# Patient Record
Sex: FEMALE | Employment: PART TIME | URBAN - NONMETROPOLITAN AREA
[De-identification: names, ages, dates, MRNs, and addresses within clinical notes are randomized per-mention and may not be internally consistent; named-entity substitution may affect disease eponyms.]

---

## 2019-05-08 ENCOUNTER — OFFICE VISIT (OUTPATIENT)
Dept: URGENT CARE | Facility: PHYSICIAN GROUP | Age: 29
End: 2019-05-08
Payer: MEDICAID

## 2019-05-08 ENCOUNTER — HOSPITAL ENCOUNTER (OUTPATIENT)
Facility: MEDICAL CENTER | Age: 29
End: 2019-05-08
Attending: PHYSICIAN ASSISTANT
Payer: MEDICAID

## 2019-05-08 VITALS
OXYGEN SATURATION: 92 % | TEMPERATURE: 98 F | HEART RATE: 82 BPM | SYSTOLIC BLOOD PRESSURE: 122 MMHG | WEIGHT: 203 LBS | DIASTOLIC BLOOD PRESSURE: 76 MMHG | RESPIRATION RATE: 16 BRPM | BODY MASS INDEX: 34.66 KG/M2 | HEIGHT: 64 IN

## 2019-05-08 DIAGNOSIS — R10.30 LOWER ABDOMINAL PAIN: ICD-10-CM

## 2019-05-08 DIAGNOSIS — N30.00 ACUTE CYSTITIS WITHOUT HEMATURIA: ICD-10-CM

## 2019-05-08 DIAGNOSIS — K29.70 GASTRITIS WITHOUT BLEEDING, UNSPECIFIED CHRONICITY, UNSPECIFIED GASTRITIS TYPE: ICD-10-CM

## 2019-05-08 DIAGNOSIS — E66.9 OBESITY (BMI 30-39.9): ICD-10-CM

## 2019-05-08 LAB
APPEARANCE UR: NORMAL
BILIRUB UR STRIP-MCNC: NORMAL MG/DL
COLOR UR AUTO: NORMAL
GLUCOSE UR STRIP.AUTO-MCNC: NORMAL MG/DL
KETONES UR STRIP.AUTO-MCNC: NORMAL MG/DL
LEUKOCYTE ESTERASE UR QL STRIP.AUTO: NORMAL
NITRITE UR QL STRIP.AUTO: NORMAL
PH UR STRIP.AUTO: 6 [PH] (ref 5–8)
PROT UR QL STRIP: 30 MG/DL
RBC UR QL AUTO: NORMAL
SP GR UR STRIP.AUTO: 1.03
UROBILINOGEN UR STRIP-MCNC: 2 MG/DL

## 2019-05-08 PROCEDURE — 81002 URINALYSIS NONAUTO W/O SCOPE: CPT | Performed by: PHYSICIAN ASSISTANT

## 2019-05-08 PROCEDURE — 99214 OFFICE O/P EST MOD 30 MIN: CPT | Mod: 25 | Performed by: PHYSICIAN ASSISTANT

## 2019-05-08 PROCEDURE — 87086 URINE CULTURE/COLONY COUNT: CPT

## 2019-05-08 RX ORDER — NITROFURANTOIN 25; 75 MG/1; MG/1
100 CAPSULE ORAL EVERY 12 HOURS
Qty: 10 CAP | Refills: 0 | Status: SHIPPED | OUTPATIENT
Start: 2019-05-08 | End: 2019-05-13

## 2019-05-08 RX ORDER — OMEPRAZOLE 20 MG/1
20 CAPSULE, DELAYED RELEASE ORAL DAILY
Qty: 30 CAP | Refills: 0 | Status: SHIPPED | OUTPATIENT
Start: 2019-05-08 | End: 2021-11-11

## 2019-05-08 ASSESSMENT — PATIENT HEALTH QUESTIONNAIRE - PHQ9: CLINICAL INTERPRETATION OF PHQ2 SCORE: 0

## 2019-05-09 DIAGNOSIS — N30.00 ACUTE CYSTITIS WITHOUT HEMATURIA: ICD-10-CM

## 2019-05-09 NOTE — PROGRESS NOTES
Chief Complaint   Patient presents with   • GI Problem       HISTORY OF PRESENT ILLNESS: Patient is a 29 y.o. female who presents today because she has some abdominal discomfort.  She has some discomfort in her lower abdomen, some discomfort in her upper abdomen.  Her symptoms have been going on between a few days and a week or so.  She denies any fevers, chills, nausea, vomiting or diarrhea.  She has had normal bowel movements twice in the last 2 days without any blood or mucus.  She has not been taking any medications for her symptoms.    Patient Active Problem List    Diagnosis Date Noted   • Obesity (BMI 30-39.9) 05/08/2019       Allergies:Patient has no known allergies.    Current Outpatient Prescriptions Ordered in [facility]   Medication Sig Dispense Refill   • nitrofurantoin monohyd macro (MACROBID) 100 MG Cap Take 1 Cap by mouth every 12 hours for 5 days. 10 Cap 0   • omeprazole (PRILOSEC) 20 MG delayed-release capsule Take 1 Cap by mouth every day. 30 Cap 0     No current Epic-ordered facility-administered medications on file.        History reviewed. No pertinent past medical history.    Social History   Substance Use Topics   • Smoking status: Never Smoker   • Smokeless tobacco: Never Used   • Alcohol use No       No family status information on file.   History reviewed. No pertinent family history.    ROS:  Review of Systems   Constitutional: Negative for fever, chills, weight loss and malaise/fatigue.   HENT: Negative for ear pain, nosebleeds, congestion, sore throat and neck pain.    Eyes: Negative for blurred vision.   Respiratory: Negative for cough, sputum production, shortness of breath and wheezing.    Cardiovascular: Negative for chest pain, palpitations, orthopnea and leg swelling.   Gastrointestinal: Negative for heartburn, positive mild nausea, no vomiting and positive for midepigastric and lower abdominal pain.   Genitourinary: Negative for dysuria, urgency and frequency.     Exam:  /76 (BP  "Location: Left arm, Patient Position: Sitting, BP Cuff Size: Large adult)   Pulse 82   Temp 36.7 °C (98 °F)   Resp 16   Ht 1.626 m (5' 4\")   Wt 92.1 kg (203 lb)   SpO2 92%   General:  Well nourished, well developed female in NAD  Head:Normocephalic, atraumatic  Eyes: PERRLA, EOM within normal limits, no conjunctival injection, no scleral icterus, visual fields and acuity grossly intact.  Nose: Symmetrical without tenderness, no discharge.  Mouth: reasonable hygiene, no erythema exudates or tonsillar enlargement.  Neck: no masses, range of motion within normal limits, no tracheal deviation. No obvious thyroid enlargement.  Pulmonary: chest is symmetrical with respiration, no wheezes, crackles, or rhonchi.  Cardiovascular: regular rate and rhythm without murmurs, rubs, or gallops.  Abdomen: Nondistended, bowel tones in all 4 quadrants, soft, she has midepigastric tenderness to palpation, as well as suprapubic tenderness to palpation, no organomegaly, no rebound or referred rebound tenderness, no Kohler's or McBurney's point tenderness.  Extremities: no clubbing, cyanosis, or edema.    Urinalysis in the office is leukocyte esterase, trace protein, trace ketones, urobilinogen    Please note that this dictation was created using voice recognition software. I have made every reasonable attempt to correct obvious errors, but I expect that there are errors of grammar and possibly content that I did not discover before finalizing the note.    Assessment/Plan:  1. Gastritis without bleeding, unspecified chronicity, unspecified gastritis type  omeprazole (PRILOSEC) 20 MG delayed-release capsule   2. Lower abdominal pain  POCT Urinalysis   3. Acute cystitis without hematuria  Urine Culture    nitrofurantoin monohyd macro (MACROBID) 100 MG Cap   4. Obesity (BMI 30-39.9)  Patient identified as having weight management issue.  Appropriate orders and counseling given.       Followup with primary care in the next 7-10 days if " not significantly improving, return to the urgent care or go to the emergency room sooner for any worsening of symptoms.

## 2019-05-11 LAB
BACTERIA UR CULT: NORMAL
SIGNIFICANT IND 70042: NORMAL
SITE SITE: NORMAL
SOURCE SOURCE: NORMAL

## 2019-06-28 ENCOUNTER — APPOINTMENT (OUTPATIENT)
Dept: URGENT CARE | Facility: PHYSICIAN GROUP | Age: 29
End: 2019-06-28
Payer: MEDICAID

## 2019-06-28 ENCOUNTER — OFFICE VISIT (OUTPATIENT)
Dept: URGENT CARE | Facility: PHYSICIAN GROUP | Age: 29
End: 2019-06-28
Payer: MEDICAID

## 2019-06-28 VITALS
HEART RATE: 68 BPM | HEIGHT: 64 IN | RESPIRATION RATE: 16 BRPM | SYSTOLIC BLOOD PRESSURE: 116 MMHG | WEIGHT: 192 LBS | BODY MASS INDEX: 32.78 KG/M2 | TEMPERATURE: 97.9 F | DIASTOLIC BLOOD PRESSURE: 80 MMHG | OXYGEN SATURATION: 100 %

## 2019-06-28 DIAGNOSIS — J02.9 PHARYNGITIS, UNSPECIFIED ETIOLOGY: ICD-10-CM

## 2019-06-28 DIAGNOSIS — H66.002 ACUTE SUPPURATIVE OTITIS MEDIA OF LEFT EAR WITHOUT SPONTANEOUS RUPTURE OF TYMPANIC MEMBRANE, RECURRENCE NOT SPECIFIED: ICD-10-CM

## 2019-06-28 DIAGNOSIS — R05.9 COUGH: ICD-10-CM

## 2019-06-28 PROCEDURE — 99214 OFFICE O/P EST MOD 30 MIN: CPT | Performed by: NURSE PRACTITIONER

## 2019-06-28 RX ORDER — BENZONATATE 100 MG/1
100 CAPSULE ORAL 3 TIMES DAILY PRN
Qty: 60 CAP | Refills: 0 | Status: SHIPPED | OUTPATIENT
Start: 2019-06-28 | End: 2021-10-28

## 2019-06-28 RX ORDER — AMOXICILLIN AND CLAVULANATE POTASSIUM 875; 125 MG/1; MG/1
1 TABLET, FILM COATED ORAL 2 TIMES DAILY
Qty: 14 TAB | Refills: 0 | Status: SHIPPED | OUTPATIENT
Start: 2019-06-28 | End: 2019-07-05

## 2019-06-28 ASSESSMENT — ENCOUNTER SYMPTOMS
SORE THROAT: 1
MYALGIAS: 0
HEADACHES: 0
COUGH: 1
SWOLLEN GLANDS: 0
FEVER: 0
RHINORRHEA: 1
NAUSEA: 0
DIARRHEA: 0
EYE PAIN: 0
DIZZINESS: 0
CHILLS: 1
VOMITING: 0
SHORTNESS OF BREATH: 0

## 2019-06-28 NOTE — PROGRESS NOTES
"Subjective:   Janae Armstrong is a 29 y.o. female who presents for Pharyngitis        URI    This is a new problem. Episode onset: 3 days. The problem has been gradually worsening. There has been no fever. Associated symptoms include coughing, ear pain (left), rhinorrhea and a sore throat. Pertinent negatives include no chest pain, diarrhea, headaches, nausea, plugged ear sensation, rash, swollen glands or vomiting. She has tried acetaminophen for the symptoms. The treatment provided no relief.     Review of Systems   Constitutional: Positive for chills and malaise/fatigue. Negative for fever.   HENT: Positive for ear pain (left), rhinorrhea and sore throat.    Eyes: Negative for pain.   Respiratory: Positive for cough. Negative for shortness of breath.    Cardiovascular: Negative for chest pain.   Gastrointestinal: Negative for diarrhea, nausea and vomiting.   Genitourinary: Negative for hematuria.   Musculoskeletal: Negative for myalgias.   Skin: Negative for rash.   Neurological: Negative for dizziness and headaches.     No Known Allergies   Objective:   /80 (BP Location: Left arm, Patient Position: Sitting, BP Cuff Size: Adult)   Pulse 68   Temp 36.6 °C (97.9 °F) (Temporal)   Resp 16   Ht 1.626 m (5' 4\")   Wt 87.1 kg (192 lb)   SpO2 100%   BMI 32.96 kg/m²   Physical Exam   Constitutional: She is oriented to person, place, and time. She appears well-developed and well-nourished. No distress.   HENT:   Head: Normocephalic and atraumatic.   Right Ear: Tympanic membrane, external ear and ear canal normal.   Left Ear: External ear and ear canal normal. Tympanic membrane is erythematous and bulging. A middle ear effusion is present.   Nose: Nose normal. Right sinus exhibits no maxillary sinus tenderness and no frontal sinus tenderness. Left sinus exhibits no maxillary sinus tenderness and no frontal sinus tenderness.   Mouth/Throat: Uvula is midline, oropharynx is clear and moist and mucous " membranes are normal. No posterior oropharyngeal edema, posterior oropharyngeal erythema or tonsillar abscesses. No tonsillar exudate.   Eyes: Pupils are equal, round, and reactive to light. Conjunctivae and EOM are normal. Right eye exhibits no discharge. Left eye exhibits no discharge.   Cardiovascular: Normal rate and regular rhythm.    No murmur heard.  Pulmonary/Chest: Effort normal and breath sounds normal. No respiratory distress.   Abdominal: Soft. She exhibits no distension. There is no tenderness.   Neurological: She is alert and oriented to person, place, and time. She has normal reflexes. No sensory deficit.   Skin: Skin is warm, dry and intact.   Psychiatric: She has a normal mood and affect.         Assessment/Plan:     1. Acute suppurative otitis media of left ear without spontaneous rupture of tympanic membrane, recurrence not specified  amoxicillin-clavulanate (AUGMENTIN) 875-125 MG Tab   2. Pharyngitis, unspecified etiology     3. Cough  benzonatate (TESSALON) 100 MG Cap     Advised to continue supportive care with Tylenol and/or ibuprofen for fevers and discomfort. Increased fluids and electrolytes.  Patient given precautionary s/sx that mandate immediate follow up and evaluation in the ED. Advised of risks of not doing so.    DDX, Supportive care, and indications for immediate follow-up discussed with patient.    Instructed to return to clinic or nearest emergency department if we are not available for any change in condition, further concerns, or worsening of symptoms.    The patient demonstrated a good understanding and agreed with the treatment plan.

## 2021-10-28 ENCOUNTER — OFFICE VISIT (OUTPATIENT)
Dept: URGENT CARE | Facility: PHYSICIAN GROUP | Age: 31
End: 2021-10-28
Payer: MEDICAID

## 2021-10-28 VITALS
BODY MASS INDEX: 33.12 KG/M2 | TEMPERATURE: 98.3 F | RESPIRATION RATE: 16 BRPM | HEIGHT: 64 IN | WEIGHT: 194 LBS | SYSTOLIC BLOOD PRESSURE: 108 MMHG | OXYGEN SATURATION: 99 % | HEART RATE: 78 BPM | DIASTOLIC BLOOD PRESSURE: 68 MMHG

## 2021-10-28 DIAGNOSIS — H92.01 RIGHT EAR PAIN: ICD-10-CM

## 2021-10-28 DIAGNOSIS — S09.91XA INJURY OF EXTERNAL AUDITORY CANAL, INITIAL ENCOUNTER: ICD-10-CM

## 2021-10-28 DIAGNOSIS — S09.301A INJURY OF TYMPANIC MEMBRANE OF RIGHT EAR, INITIAL ENCOUNTER: ICD-10-CM

## 2021-10-28 PROCEDURE — 99214 OFFICE O/P EST MOD 30 MIN: CPT | Performed by: NURSE PRACTITIONER

## 2021-10-28 ASSESSMENT — ENCOUNTER SYMPTOMS
CHILLS: 0
FEVER: 0
HEADACHES: 0

## 2021-10-28 NOTE — PROGRESS NOTES
"Janae Armstrong is a 31 y.o. female who presents for Ear Injury ( Q-Tip right ear)      HPI this new problem.  Janae is a 31-year-old female presents with ear injury.  Last night she was cleaning ears with qtip. Pushing down and the qtip slipped. She then had fluid and blood in her ear with pain. Ear is sore, tender and achy. Increased pain when she was eating.  She looked on her pillowcase this morning there was no blood.  Her hearing is reported as normal.  Her right ear is a little more sensitive to sound at this point.  Treatments tried none.  No other aggravating or alleviating factors.    Review of Systems   Constitutional: Negative for chills and fever.   HENT: Positive for ear pain. Negative for congestion, hearing loss and tinnitus.    Neurological: Negative for headaches.       Allergies:     No Known Allergies    PMSFS Hx:  No past medical history on file.  No past surgical history on file.  No family history on file.  Social History     Tobacco Use   • Smoking status: Light Tobacco Smoker   • Smokeless tobacco: Never Used   Substance Use Topics   • Alcohol use: No       Problems:   Patient Active Problem List   Diagnosis   • Obesity (BMI 30-39.9)       Medications:   Current Outpatient Medications on File Prior to Visit   Medication Sig Dispense Refill   • benzonatate (TESSALON) 100 MG Cap Take 1 Cap by mouth 3 times a day as needed for Cough. (Patient not taking: Reported on 10/28/2021) 60 Cap 0   • omeprazole (PRILOSEC) 20 MG delayed-release capsule Take 1 Cap by mouth every day. (Patient not taking: Reported on 10/28/2021) 30 Cap 0     No current facility-administered medications on file prior to visit.          Objective:     /68   Pulse 78   Temp 36.8 °C (98.3 °F) (Temporal)   Resp 16   Ht 1.626 m (5' 4\")   Wt 88 kg (194 lb)   SpO2 99%   BMI 33.30 kg/m²     Physical Exam  Vitals and nursing note reviewed.   Constitutional:       Appearance: She is well-developed.   HENT:      " Head: Normocephalic.      Right Ear: Hearing normal. No decreased hearing noted. Tenderness present. No swelling. No middle ear effusion. There is impacted cerumen. Tympanic membrane is erythematous. Tympanic membrane is not retracted or bulging.      Left Ear: Hearing, tympanic membrane, ear canal and external ear normal. No decreased hearing noted.      Ears:        Comments: Unable to determine integrity of tympanic membrane on the right due to traumatic injury to the external canal with mild erythema as well as excessive cerumen that is occluding a third of the tympanic membrane.  She would not tolerate removal of cerumen today due to her trauma.  There is a small amount of blood in the external canal that seems isolated to the area of injury and inflammation.  TM appears to be intact     Mouth/Throat:      Mouth: Mucous membranes are moist.   Eyes:      Conjunctiva/sclera: Conjunctivae normal.   Cardiovascular:      Rate and Rhythm: Normal rate and regular rhythm.   Pulmonary:      Effort: Pulmonary effort is normal. No respiratory distress.      Breath sounds: Normal breath sounds.   Musculoskeletal:         General: Normal range of motion.      Cervical back: Normal range of motion and neck supple.   Lymphadenopathy:      Head:      Right side of head: No tonsillar adenopathy.      Left side of head: No tonsillar adenopathy.      Cervical: No cervical adenopathy.      Upper Body:      Right upper body: No supraclavicular adenopathy.      Left upper body: No supraclavicular adenopathy.   Skin:     General: Skin is warm and dry.   Neurological:      Mental Status: She is alert and oriented to person, place, and time.      Deep Tendon Reflexes: Reflexes are normal and symmetric.   Psychiatric:         Mood and Affect: Mood normal.         Speech: Speech normal.         Behavior: Behavior normal.         Thought Content: Thought content normal.         Assessment /Associated Orders:      1. Injury of tympanic  membrane of right ear, initial encounter  REFERRAL TO ENT   2. Right ear pain     3. Injury of external auditory canal, initial encounter             Medical Decision Making:    Pt is clinically stable at today's acute urgent care visit.  No acute distress noted. Appropriate for outpatient management at this time.   Acute problem today with uncertain prognosis.     Referral to ENT for follow-up and further evaluation of TM. She could also follow up with her PCP or urgent care.   Advised not to put anything into her ear  Stop using Q-tips  Over-the-counter analgesic of choice.  Warm compresses as needed pain      Advised to follow-up with the primary care provider for recheck, reevaluation, and consideration of further management if necessary.   Discussed management options (risks,benefits, and alternatives to treatment). Expressed understanding and the treatment plan was agreed upon. Questions were encouraged and answered   Return to urgent care prn if new or worsening sx or if there is no improvement in condition prn.    Educated in Red flags and indications to immediately call 911 or present to the Emergency Department.     I personally reviewed prior external notes and test results pertinent to today's visit.  I have independently reviewed and interpreted all diagnostics ordered during this urgent care acute visit.   Time spent evaluating this patient was at least 30 minutes and includes preparing for visit, counseling/education, exam and evaluation, obtaining history, independent interpretation, ordering lab/test/procedures,medication management and documentation.Time does not include separately billable procedures noted .

## 2021-11-11 ENCOUNTER — OFFICE VISIT (OUTPATIENT)
Dept: URGENT CARE | Facility: PHYSICIAN GROUP | Age: 31
End: 2021-11-11
Payer: MEDICAID

## 2021-11-11 VITALS
HEIGHT: 64 IN | OXYGEN SATURATION: 98 % | BODY MASS INDEX: 32.27 KG/M2 | TEMPERATURE: 99 F | DIASTOLIC BLOOD PRESSURE: 70 MMHG | HEART RATE: 69 BPM | SYSTOLIC BLOOD PRESSURE: 110 MMHG | WEIGHT: 189 LBS | RESPIRATION RATE: 16 BRPM

## 2021-11-11 DIAGNOSIS — L08.9 LOCAL SKIN INFECTION: ICD-10-CM

## 2021-11-11 PROCEDURE — 99214 OFFICE O/P EST MOD 30 MIN: CPT | Performed by: PHYSICIAN ASSISTANT

## 2021-11-11 RX ORDER — SULFAMETHOXAZOLE AND TRIMETHOPRIM 800; 160 MG/1; MG/1
1 TABLET ORAL 2 TIMES DAILY
Qty: 10 TABLET | Refills: 0 | Status: SHIPPED | OUTPATIENT
Start: 2021-11-11 | End: 2021-11-16

## 2021-11-11 ASSESSMENT — ENCOUNTER SYMPTOMS
FEVER: 0
CHILLS: 0

## 2021-11-11 NOTE — PROGRESS NOTES
"  Subjective:   Janae Armstrong is a 31 y.o. female who presents today with   Chief Complaint   Patient presents with   • Skin Lesion     LEFT LEG, BUMP POPPED AND TENDER       Other  This is a new problem. The current episode started in the past 7 days. The problem occurs constantly. The problem has been unchanged. Pertinent negatives include no chills or fever. Treatments tried: popping the lesion. The treatment provided mild relief.   Patient states she had a cut on her left anterior lower leg in January and had some sutures placed but she waited longer than 10 days to have them removed and when she had them removed there was still some that were intact internally.  Patient states a couple of days ago she had a bump just superior to the healed laceration site that looked like a pimple and she popped it and there was some pus that came out.  PMH:  has no past medical history on file.  MEDS:   Current Outpatient Medications:   •  sulfamethoxazole-trimethoprim (BACTRIM DS) 800-160 MG tablet, Take 1 Tablet by mouth 2 times a day for 5 days., Disp: 10 Tablet, Rfl: 0  ALLERGIES: No Known Allergies  SURGHX: No past surgical history on file.  SOCHX:  reports that she has been smoking. She has never used smokeless tobacco. She reports that she does not drink alcohol and does not use drugs.  FH: Reviewed with patient, not pertinent to this visit.     Review of Systems   Constitutional: Negative for chills and fever.   Skin:        Pimple/lesion to left lower extremity      Objective:   /70   Pulse 69   Temp 37.2 °C (99 °F) (Temporal)   Resp 16   Ht 1.626 m (5' 4\")   Wt 85.7 kg (189 lb) Comment: WITH SHOES  SpO2 98%   BMI 32.44 kg/m²   Physical Exam  Vitals and nursing note reviewed.   Constitutional:       General: She is not in acute distress.     Appearance: Normal appearance. She is well-developed. She is not ill-appearing or toxic-appearing.   HENT:      Head: Normocephalic and atraumatic.      " Right Ear: Hearing normal.      Left Ear: Hearing normal.   Eyes:      Conjunctiva/sclera: Conjunctivae normal.   Cardiovascular:      Rate and Rhythm: Normal rate.   Pulmonary:      Effort: Pulmonary effort is normal.   Musculoskeletal:      Comments: Normal movement in all 4 extremities   Skin:     General: Skin is warm and dry.             Comments: Small area of erythema just above the scar on the left anterior lower extremity.  No streaking or drainage at this time.   Neurological:      Mental Status: She is alert.      Coordination: Coordination normal.   Psychiatric:         Mood and Affect: Mood normal.       Assessment/Plan:   Assessment    1. Local skin infection  - sulfamethoxazole-trimethoprim (BACTRIM DS) 800-160 MG tablet; Take 1 Tablet by mouth 2 times a day for 5 days.  Dispense: 10 Tablet; Refill: 0  Symptoms and presentation are consistent with local skin infection and will treat accordingly with oral antibiotics.  Antibiotic ointment and bandage placed today.  Recommend patient keep the area covered.  Differential diagnosis, natural history, supportive care, and indications for immediate follow-up discussed.   Patient given instructions and understanding of medications and treatment.    If not improving in 3-5 days, F/U with PCP or return to  if symptoms worsen.    Patient agreeable to plan.  Greater than 30 minutes were spent reviewing patient's chart, examining and obtaining history from patient, and discussing plan of care.       Please note that this dictation was created using voice recognition software. I have made every reasonable attempt to correct obvious errors, but I expect that there are errors of grammar and possibly content that I did not discover before finalizing the note.    Arnol Figueroa PA-C

## 2022-07-20 ENCOUNTER — OFFICE VISIT (OUTPATIENT)
Dept: URGENT CARE | Facility: PHYSICIAN GROUP | Age: 32
End: 2022-07-20
Payer: MEDICAID

## 2022-07-20 VITALS
HEIGHT: 64 IN | WEIGHT: 199 LBS | RESPIRATION RATE: 16 BRPM | DIASTOLIC BLOOD PRESSURE: 70 MMHG | TEMPERATURE: 98.4 F | OXYGEN SATURATION: 98 % | HEART RATE: 76 BPM | BODY MASS INDEX: 33.97 KG/M2 | SYSTOLIC BLOOD PRESSURE: 112 MMHG

## 2022-07-20 DIAGNOSIS — T81.89XA RETAINED SUTURE, INITIAL ENCOUNTER: ICD-10-CM

## 2022-07-20 DIAGNOSIS — M79.5 FOREIGN BODY (FB) IN SOFT TISSUE: ICD-10-CM

## 2022-07-20 DIAGNOSIS — Z18.9 RETAINED SUTURE, INITIAL ENCOUNTER: ICD-10-CM

## 2022-07-20 PROCEDURE — 99417 PROLNG OP E/M EACH 15 MIN: CPT | Performed by: STUDENT IN AN ORGANIZED HEALTH CARE EDUCATION/TRAINING PROGRAM

## 2022-07-20 PROCEDURE — 99215 OFFICE O/P EST HI 40 MIN: CPT | Performed by: STUDENT IN AN ORGANIZED HEALTH CARE EDUCATION/TRAINING PROGRAM

## 2022-07-20 RX ORDER — CEPHALEXIN 500 MG/1
1000 CAPSULE ORAL 2 TIMES DAILY
Qty: 20 CAPSULE | Refills: 0 | Status: SHIPPED | OUTPATIENT
Start: 2022-07-20 | End: 2022-07-25

## 2022-07-21 NOTE — PROGRESS NOTES
"Subjective:   CHIEF COMPLAINT  Chief Complaint   Patient presents with   • Suture / Staple Removal     Has been in her leg for 1 year       [facility]  Janae Apple Armstrong is a 32 y.o. female who presents for suture removal.  Approximately 1 year patient was treated at an outside facility for a laceration she sustained to her left lower extremity.  Says there were several deep sutures placed which have been tunneling out of the wound site.  She returned to the facility recently and showed them the suture, reevaluated for removal of sutures, but it was felt that they needed to continue to be in place.  Area has been causing the patient pain and she is requesting sutures to be removed.  She is accompanied by her boyfriend/partner.    REVIEW OF SYSTEMS  General: no fever or chills  GI: no nausea or vomiting  See HPI for further details.    PAST MEDICAL HISTORY  Patient Active Problem List    Diagnosis Date Noted   • Obesity (BMI 30-39.9) 05/08/2019       SURGICAL HISTORY  patient denies any surgical history    ALLERGIES  No Known Allergies    CURRENT MEDICATIONS  Home Medications     Reviewed by Hiram Yuan'ector (Medical Assistant) on 07/20/22 at 1623  Med List Status: <None>   Medication Last Dose Status        Patient Alvaro Taking any Medications                       SOCIAL HISTORY  Social History     Tobacco Use   • Smoking status: Former Smoker   • Smokeless tobacco: Never Used   Vaping Use   • Vaping Use: Never used   Substance and Sexual Activity   • Alcohol use: No   • Drug use: No   • Sexual activity: Not on file       FAMILY HISTORY  No family history on file.       Objective:   PHYSICAL EXAM  VITAL SIGNS: /70   Pulse 76   Temp 36.9 °C (98.4 °F) (Temporal)   Resp 16   Ht 1.626 m (5' 4\")   Wt 90.3 kg (199 lb)   SpO2 98%   BMI 34.16 kg/m²     Gen: no acute distress, normal voice  Skin: Left lower extremity, middle one third: Obvious subcutaneous/dermal suture tunneling through the epidermis " with surrounding erythema and tenderness to palpation.  Head: Atraumatic, normocephalic  Psych: normal affect, normal judgement, alert, awake     Procedure note: Suture removal  Area was cleaned with iodine and using mosquito forceps I added traction to the visible suture but this was causing moderate discernible discomfort to the patient so I discontinued and opted to for local anesthesia.  Site was then recleaned with iodine and then I injected 3 cc of 1% lidocaine without epinephrine into the region.  Once appropriate anesthesia was achieved I then added light traction to the suture, loosening the material from the surrounding tissue and was able to successfully remove the suture. There was scant serosanguineous discharge from the wound.  I then lightly probed the surrounding tissue and there is not any immediate evidence of any additional retained sutures.    The boyfriend and patient were concerned that there was an additional buried suture approximately 2 cm adjacent and medial to the area described above.  I was able to visualize a pinpoint black nonorganic item.  Site was then recleaned with iodine and then injected 5 cc of 1% lidocaine without epinephrine into the region.  After appropriate analgesia was achieved, using an 11 blade scalpel a small incision was made.  I was unable to locate a second suture and again added light traction which was enough to remove it from the tissue.  I could not discern if this was the entire suture or the knot had come undone.  This area was then lightly probed and there is not any additional evidence of retained sutures.    Wounds were then thoroughly flushed with sterile saline, covered with Polysporin and bandages.       Assessment/Plan:     1. Foreign body (FB) in soft tissue  cephALEXin (KEFLEX) 500 MG Cap    mupirocin (BACTROBAN) 2 % Ointment    Referral to General Surgery   2. Retained suture, initial encounter     2 sutures were removed as described above.  Tetanus  is up-to-date.  I suspect there is at least 1 additional retained suture but I was not able to visualize any additional foreign bodies.  Therefore I did submit a referral to follow-up with general surgery for continued management and reevaluation.  I sent prescriptions for PPx cephalexin and Polysporin.  Routine wound care was discussed.  Return to urgent care if develop any new or worsening symptoms as needed.  Both the patient and boyfriend understood everything discussed today.  All questions were answered.     Differential diagnosis, natural history, supportive care, and indications for immediate follow-up discussed. All questions answered. Patient agrees with the plan of care.    Follow-up as needed if symptoms worsen or fail to improve to PCP, Urgent care or Emergency Room.    62 minutes was spent on this encounter performing the procedure as described above.       Please note that this dictation was created using voice recognition software. I have made a reasonable attempt to correct obvious errors, but I expect that there are errors of grammar and possibly content that I did not discover before finalizing the note.